# Patient Record
Sex: FEMALE | Race: WHITE | NOT HISPANIC OR LATINO | Employment: FULL TIME | URBAN - METROPOLITAN AREA
[De-identification: names, ages, dates, MRNs, and addresses within clinical notes are randomized per-mention and may not be internally consistent; named-entity substitution may affect disease eponyms.]

---

## 2017-01-16 RX ORDER — LORAZEPAM 1 MG/1
TABLET ORAL
Qty: 30 TABLET | Refills: 0 | OUTPATIENT
Start: 2017-01-16 | End: 2017-02-20 | Stop reason: SDUPTHER

## 2017-02-20 RX ORDER — LORAZEPAM 1 MG/1
TABLET ORAL
Qty: 30 TABLET | Refills: 0 | Status: SHIPPED | OUTPATIENT
Start: 2017-02-20 | End: 2017-03-21 | Stop reason: SDUPTHER

## 2017-02-20 RX ORDER — HYDROXYZINE HYDROCHLORIDE 25 MG/1
TABLET, FILM COATED ORAL
Qty: 30 TABLET | Refills: 1 | Status: SHIPPED | OUTPATIENT
Start: 2017-02-20 | End: 2017-04-19 | Stop reason: SDUPTHER

## 2017-02-22 RX ORDER — LORAZEPAM 1 MG/1
TABLET ORAL
Qty: 30 TABLET | Refills: 0 | OUTPATIENT
Start: 2017-02-22

## 2017-03-21 RX ORDER — LORAZEPAM 1 MG/1
TABLET ORAL
Qty: 30 TABLET | Refills: 0 | Status: SHIPPED | OUTPATIENT
Start: 2017-03-21 | End: 2017-04-19 | Stop reason: SDUPTHER

## 2017-04-26 RX ORDER — HYDROXYZINE HYDROCHLORIDE 25 MG/1
TABLET, FILM COATED ORAL
Qty: 30 TABLET | Refills: 1 | Status: SHIPPED | OUTPATIENT
Start: 2017-04-26 | End: 2017-06-15 | Stop reason: SDUPTHER

## 2017-04-26 RX ORDER — LORAZEPAM 1 MG/1
TABLET ORAL
Qty: 30 TABLET | Refills: 0 | OUTPATIENT
Start: 2017-04-26 | End: 2017-06-16 | Stop reason: SDUPTHER

## 2017-06-16 RX ORDER — LORAZEPAM 1 MG/1
TABLET ORAL
Qty: 30 TABLET | Refills: 0 | OUTPATIENT
Start: 2017-06-16 | End: 2017-06-19

## 2017-06-16 RX ORDER — HYDROXYZINE HYDROCHLORIDE 25 MG/1
TABLET, FILM COATED ORAL
Qty: 30 TABLET | Refills: 0 | Status: SHIPPED | OUTPATIENT
Start: 2017-06-16 | End: 2017-06-19

## 2017-06-16 RX ORDER — LORAZEPAM 1 MG/1
TABLET ORAL
Qty: 30 TABLET | Refills: 0 | OUTPATIENT
Start: 2017-06-16 | End: 2017-06-19 | Stop reason: SDUPTHER

## 2017-06-16 NOTE — TELEPHONE ENCOUNTER
You can call in #7 TABLETS ONLY with 0 refills.  Patient needs to be seen--it's the law.  Please schedule an appt for her in the next week.  Thanks.

## 2017-06-19 ENCOUNTER — OFFICE VISIT (OUTPATIENT)
Dept: FAMILY MEDICINE CLINIC | Facility: CLINIC | Age: 52
End: 2017-06-19

## 2017-06-19 VITALS
DIASTOLIC BLOOD PRESSURE: 76 MMHG | OXYGEN SATURATION: 100 % | BODY MASS INDEX: 34.15 KG/M2 | HEIGHT: 64 IN | TEMPERATURE: 97.6 F | WEIGHT: 200 LBS | HEART RATE: 76 BPM | SYSTOLIC BLOOD PRESSURE: 120 MMHG

## 2017-06-19 DIAGNOSIS — Z79.899 HIGH RISK MEDICATION USE: Primary | ICD-10-CM

## 2017-06-19 DIAGNOSIS — G47.00 INSOMNIA, UNSPECIFIED: ICD-10-CM

## 2017-06-19 DIAGNOSIS — R73.01 IMPAIRED FASTING GLUCOSE: ICD-10-CM

## 2017-06-19 DIAGNOSIS — Z79.899 HIGH RISK MEDICATION USE: ICD-10-CM

## 2017-06-19 DIAGNOSIS — G47.09 OTHER INSOMNIA: ICD-10-CM

## 2017-06-19 DIAGNOSIS — I10 ESSENTIAL HYPERTENSION: Primary | ICD-10-CM

## 2017-06-19 DIAGNOSIS — F41.9 ANXIETY DISORDER, UNSPECIFIED TYPE: ICD-10-CM

## 2017-06-19 DIAGNOSIS — E78.2 MIXED HYPERLIPIDEMIA: ICD-10-CM

## 2017-06-19 PROCEDURE — 99214 OFFICE O/P EST MOD 30 MIN: CPT | Performed by: NURSE PRACTITIONER

## 2017-06-19 RX ORDER — HYDROCHLOROTHIAZIDE 12.5 MG/1
12.5 CAPSULE, GELATIN COATED ORAL DAILY
Qty: 90 CAPSULE | Refills: 1 | Status: SHIPPED | OUTPATIENT
Start: 2017-06-19 | End: 2017-12-06 | Stop reason: SDUPTHER

## 2017-06-19 RX ORDER — OMEPRAZOLE 40 MG/1
40 CAPSULE, DELAYED RELEASE ORAL DAILY
Qty: 30 CAPSULE | Refills: 5
Start: 2017-06-19 | End: 2017-12-06 | Stop reason: SDUPTHER

## 2017-06-19 RX ORDER — LORAZEPAM 1 MG/1
TABLET ORAL
Qty: 30 TABLET | Refills: 0 | Status: SHIPPED | OUTPATIENT
Start: 2017-06-19 | End: 2017-07-25 | Stop reason: SDUPTHER

## 2017-06-19 RX ORDER — SULFASALAZINE 500 MG/1
1000 TABLET ORAL 3 TIMES DAILY
Qty: 540 TABLET | Refills: 1 | Status: SHIPPED | OUTPATIENT
Start: 2017-06-19

## 2017-06-19 RX ORDER — HYDROXYZINE 50 MG/1
50 TABLET, FILM COATED ORAL NIGHTLY PRN
Qty: 30 TABLET | Refills: 5 | Status: SHIPPED | OUTPATIENT
Start: 2017-06-19 | End: 2017-12-06 | Stop reason: SDUPTHER

## 2017-06-19 RX ORDER — AMLODIPINE BESYLATE 5 MG/1
5 TABLET ORAL DAILY
Qty: 90 TABLET | Refills: 1 | Status: SHIPPED | OUTPATIENT
Start: 2017-06-19 | End: 2017-11-28 | Stop reason: SDUPTHER

## 2017-06-19 RX ORDER — METOPROLOL TARTRATE 100 MG/1
100 TABLET ORAL 2 TIMES DAILY
Qty: 180 TABLET | Refills: 1 | Status: SHIPPED | OUTPATIENT
Start: 2017-06-19 | End: 2017-11-28 | Stop reason: SDUPTHER

## 2017-06-19 NOTE — PROGRESS NOTES
Subjective   Nelia Todd is a 51 y.o. female.     History of Present Illness   6 month f/u.  Needs fasting blood work and UDS.      Insomnia/anxiety disorder:  Taking hydroxyzine 2-25 mg tablets at bedtime along with lorazepam 1 mg.  She has taken this for many years.  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      HTN:  Controlled with meds.    Mixed hyperlipidemia/impaired fasting glucose:  Needs fasting blood work.  Last TGs in December 2016 were elevated at 255.  She really has not made any dietary changes and not lost any weight since then.      The following portions of the patient's history were reviewed and updated as appropriate: allergies, current medications, past family history, past medical history, past social history, past surgical history and problem list.    Review of Systems   Respiratory: Negative.    Cardiovascular: Negative.    Psychiatric/Behavioral: Positive for sleep disturbance. The patient is not nervous/anxious.    All other systems reviewed and are negative.      Objective   Physical Exam   Constitutional: Vital signs are normal. She appears well-developed and well-nourished.   Cardiovascular: Normal rate, regular rhythm, S1 normal, S2 normal and normal heart sounds.    No murmur heard.  Pulses:       Carotid pulses are 2+ on the right side, and 2+ on the left side.  No carotid bruits   Pulmonary/Chest: Effort normal and breath sounds normal.   Neurological: She is alert.   Psychiatric: She has a normal mood and affect.   Nursing note and vitals reviewed.      Assessment/Plan   Nelia was seen today for medication problem.    Diagnoses and all orders for this visit:    Essential hypertension    Mixed hyperlipidemia    High risk medication use    Insomnia, unspecified    Anxiety disorder, unspecified type    Impaired fasting glucose    Other orders  -     hydrOXYzine (ATARAX) 50 MG tablet; Take 1 tablet by mouth At Night As Needed (sleep).  -     LORazepam (ATIVAN) 1 MG tablet; TAKE ONE  TABLET BY MOUTH EVERY NIGHT AT BEDTIME  -     omeprazole (priLOSEC) 40 MG capsule; Take 1 capsule by mouth Daily.  -     sulfaSALAzine (AZULFIDINE) 500 MG tablet; Take 2 tablets by mouth 3 (Three) Times a Day.  -     metoprolol tartrate (LOPRESSOR) 100 MG tablet; Take 1 tablet by mouth 2 (Two) Times a Day.  -     hydrochlorothiazide (MICROZIDE) 12.5 MG capsule; Take 1 capsule by mouth Daily.  -     amLODIPine (NORVASC) 5 MG tablet; Take 1 tablet by mouth Daily.        Await lab results.  ANNABELLE MAGAÑA, reviewed/acceptable.      25 min spent with patient with >50% in counseling and coordination of care.      RTC 6 months.

## 2017-06-22 ENCOUNTER — TELEPHONE (OUTPATIENT)
Dept: FAMILY MEDICINE CLINIC | Facility: CLINIC | Age: 52
End: 2017-06-22

## 2017-06-22 NOTE — TELEPHONE ENCOUNTER
----- Message from SAMIA Merritt sent at 6/22/2017  3:56 PM EDT -----  Please ashleigh pt.  She was here on 6/19.  She was supposed to give a urine sample for drug screen.  I do not see any results yet.  Did she not give use a urine sample?

## 2017-06-23 ENCOUNTER — TELEPHONE (OUTPATIENT)
Dept: FAMILY MEDICINE CLINIC | Facility: CLINIC | Age: 52
End: 2017-06-23

## 2017-06-23 DIAGNOSIS — G47.00 INSOMNIA, UNSPECIFIED TYPE: ICD-10-CM

## 2017-06-23 DIAGNOSIS — Z79.899 HIGH RISK MEDICATION USE: ICD-10-CM

## 2017-06-23 DIAGNOSIS — F41.9 ANXIETY: Primary | ICD-10-CM

## 2017-06-23 NOTE — TELEPHONE ENCOUNTER
----- Message from SAMIA Merritt sent at 6/22/2017  5:22 PM EDT -----  Call pt.  Inform her that good cholesterol is too low and triglycerides are too high.  I rec decreasing her intake of sweets and increase exercise improve her levels.  Please ask her about the urine drug screen--I don't have results other than what was done at [facility].

## 2017-06-23 NOTE — TELEPHONE ENCOUNTER
"PATIENT RETURNED PHONE CALL. SHE SAYS, \"I DID LEAVE A URINE SAMPLE AND THE LAB LADY TOOK IT.\"   "

## 2017-06-23 NOTE — TELEPHONE ENCOUNTER
----- Message from SAMIA Merritt sent at 6/23/2017 12:20 PM EDT -----  Regarding: urine drug screen  Yes, she will need to come back in.    ----- Message -----     From: Roger Uribe MA     Sent: 6/23/2017  10:03 AM       To: SAMIA Merritt    Patient left message saying that she did leave a urine sample and the lab lady took it. She also says she is willing to come back in to do it again if she needs.      ----- Message -----     From: SAMIA Merritt     Sent: 6/22/2017   3:56 PM       To: Roger Uribe MA    Please ashleigh pt.  She was here on 6/19.  She was supposed to give a urine sample for drug screen.  I do not see any results yet.  Did she not give use a urine sample?

## 2017-06-26 LAB — DRUGS UR: NORMAL

## 2017-07-04 LAB — DRUGS UR: NORMAL

## 2017-07-25 RX ORDER — LORAZEPAM 1 MG/1
TABLET ORAL
Qty: 30 TABLET | Refills: 0 | OUTPATIENT
Start: 2017-07-25 | End: 2017-08-29 | Stop reason: SDUPTHER

## 2017-08-29 RX ORDER — LORAZEPAM 1 MG/1
TABLET ORAL
Qty: 30 TABLET | Refills: 0 | OUTPATIENT
Start: 2017-08-29 | End: 2017-11-06 | Stop reason: SDUPTHER

## 2017-09-26 ENCOUNTER — TELEPHONE (OUTPATIENT)
Dept: FAMILY MEDICINE CLINIC | Facility: CLINIC | Age: 52
End: 2017-09-26

## 2017-09-26 NOTE — TELEPHONE ENCOUNTER
Patient called regarding a $600 bill for two differnt Urine drug screen. I told patient to bring the bill into the office to speak with and show the  Dennies Garrick.

## 2017-11-06 RX ORDER — LORAZEPAM 1 MG/1
TABLET ORAL
Qty: 30 TABLET | Refills: 0 | OUTPATIENT
Start: 2017-11-06 | End: 2017-12-05 | Stop reason: SDUPTHER

## 2017-11-29 RX ORDER — METOPROLOL TARTRATE 100 MG/1
TABLET ORAL
Qty: 180 TABLET | Refills: 1 | Status: SHIPPED | OUTPATIENT
Start: 2017-11-29

## 2017-11-29 RX ORDER — AMLODIPINE BESYLATE 5 MG/1
TABLET ORAL
Qty: 90 TABLET | Refills: 1 | Status: SHIPPED | OUTPATIENT
Start: 2017-11-29

## 2017-12-05 ENCOUNTER — OFFICE VISIT (OUTPATIENT)
Dept: FAMILY MEDICINE CLINIC | Facility: CLINIC | Age: 52
End: 2017-12-05

## 2017-12-05 VITALS
HEIGHT: 64 IN | HEART RATE: 72 BPM | OXYGEN SATURATION: 98 % | DIASTOLIC BLOOD PRESSURE: 78 MMHG | BODY MASS INDEX: 35.85 KG/M2 | TEMPERATURE: 98.1 F | SYSTOLIC BLOOD PRESSURE: 124 MMHG | WEIGHT: 210 LBS

## 2017-12-05 DIAGNOSIS — K21.9 GASTROESOPHAGEAL REFLUX DISEASE WITHOUT ESOPHAGITIS: ICD-10-CM

## 2017-12-05 DIAGNOSIS — I10 ESSENTIAL HYPERTENSION: Primary | ICD-10-CM

## 2017-12-05 DIAGNOSIS — E78.2 MIXED HYPERLIPIDEMIA: ICD-10-CM

## 2017-12-05 DIAGNOSIS — Z79.899 HIGH RISK MEDICATION USE: ICD-10-CM

## 2017-12-05 DIAGNOSIS — F41.9 ANXIETY DISORDER, UNSPECIFIED TYPE: ICD-10-CM

## 2017-12-05 DIAGNOSIS — G47.00 INSOMNIA, UNSPECIFIED TYPE: ICD-10-CM

## 2017-12-05 PROCEDURE — 99214 OFFICE O/P EST MOD 30 MIN: CPT | Performed by: NURSE PRACTITIONER

## 2017-12-05 RX ORDER — LORAZEPAM 1 MG/1
TABLET ORAL
Qty: 30 TABLET | Refills: 0 | Status: SHIPPED | OUTPATIENT
Start: 2017-12-05

## 2017-12-05 NOTE — PROGRESS NOTES
Subjective   Nelia Todd is a 52 y.o. female.     History of Present Illness   6 month f/u.  Htn, HLD, GERD, anxiety, insomnia all stable on meds.  Takes lorazepam for anxiety.  Hydroxyzine prn insomnia.  Needs refills.      The following portions of the patient's history were reviewed and updated as appropriate: allergies, current medications, past family history, past medical history, past social history, past surgical history and problem list.    Review of Systems   Constitutional: Negative.    Respiratory: Negative.    Cardiovascular: Negative.    Gastrointestinal: Negative.    Psychiatric/Behavioral: Positive for sleep disturbance.        Per HPI   All other systems reviewed and are negative.      Objective   Physical Exam   Constitutional: Vital signs are normal. She appears well-developed and well-nourished.   Cardiovascular: Normal rate, regular rhythm, S1 normal, S2 normal and normal heart sounds.    No murmur heard.  Pulmonary/Chest: Effort normal and breath sounds normal.   Neurological: She is alert.   Psychiatric: She has a normal mood and affect.   Nursing note and vitals reviewed.      Assessment/Plan   Nelia was seen today for follow-up.    Diagnoses and all orders for this visit:    Essential hypertension    Mixed hyperlipidemia    Gastroesophageal reflux disease without esophagitis    Anxiety disorder, unspecified type    Insomnia, unspecified type    High risk medication use    Other orders  -     LORazepam (ATIVAN) 1 MG tablet; TAKE ONE TABLET BY MOUTH EVERY NIGHT AT BEDTIME  -     hydrochlorothiazide (MICROZIDE) 12.5 MG capsule; Take 1 capsule by mouth Daily.  -     hydrOXYzine (ATARAX) 50 MG tablet; Take 1 tablet by mouth At Night As Needed (sleep).  -     omeprazole (priLOSEC) 40 MG capsule; Take 1 capsule by mouth Daily.      mya up to date.   Refills given.  F/u 6 months.

## 2017-12-06 RX ORDER — HYDROCHLOROTHIAZIDE 12.5 MG/1
12.5 CAPSULE, GELATIN COATED ORAL DAILY
Qty: 90 CAPSULE | Refills: 1 | Status: SHIPPED | OUTPATIENT
Start: 2017-12-06

## 2017-12-06 RX ORDER — HYDROXYZINE 50 MG/1
50 TABLET, FILM COATED ORAL NIGHTLY PRN
Qty: 30 TABLET | Refills: 5 | Status: SHIPPED | OUTPATIENT
Start: 2017-12-06

## 2017-12-06 RX ORDER — OMEPRAZOLE 40 MG/1
40 CAPSULE, DELAYED RELEASE ORAL DAILY
Qty: 30 CAPSULE | Refills: 5
Start: 2017-12-06

## 2017-12-20 ENCOUNTER — OFFICE VISIT (OUTPATIENT)
Dept: FAMILY MEDICINE CLINIC | Facility: CLINIC | Age: 52
End: 2017-12-20

## 2017-12-20 VITALS
SYSTOLIC BLOOD PRESSURE: 122 MMHG | RESPIRATION RATE: 14 BRPM | HEIGHT: 64 IN | TEMPERATURE: 98.6 F | BODY MASS INDEX: 35.68 KG/M2 | WEIGHT: 209 LBS | OXYGEN SATURATION: 98 % | DIASTOLIC BLOOD PRESSURE: 82 MMHG | HEART RATE: 76 BPM

## 2017-12-20 DIAGNOSIS — K51.90 ULCERATIVE COLITIS WITHOUT COMPLICATIONS, UNSPECIFIED LOCATION (HCC): Primary | ICD-10-CM

## 2017-12-20 DIAGNOSIS — K21.9 GASTROESOPHAGEAL REFLUX DISEASE WITHOUT ESOPHAGITIS: ICD-10-CM

## 2017-12-20 DIAGNOSIS — G47.00 INSOMNIA, UNSPECIFIED TYPE: ICD-10-CM

## 2017-12-20 PROCEDURE — 99213 OFFICE O/P EST LOW 20 MIN: CPT | Performed by: NURSE PRACTITIONER

## 2017-12-20 RX ORDER — UREA 10 %
LOTION (ML) TOPICAL
COMMUNITY

## 2017-12-20 NOTE — PROGRESS NOTES
"Subjective   Nelia Todd is a 52 y.o. female.     History of Present Illness   Nelia Todd 52 y.o. female who presents today for a new patient appointment.    she has a history of   Patient Active Problem List   Diagnosis   • History of colon polyps   • Hiatal hernia   • Anxiety disorder   • Insomnia, unspecified   • Gastroesophageal reflux disease   • Essential hypertension   • Ulcerative colitis without complications   • Mixed hyperlipidemia   • High risk medication use   • Impaired fasting glucose   .  she is here to establish care I reviewed the PFSH recorded today by my MA/LPN staff.   she   She has been feeling well.      Patient is presenting to the office today to get established.  Her main concern is getting a prescription when it is due in January for her lorazepam which she takes nightly for insomnia.  She also uses melatonin and Atarax for insomnia.  Patient has been using lorazepam 1 mg every night for the past 5 years.  She was seeing Milly Ramírez nurse practitioner for the past year he has been refilling this medication for her.  Looking back in her chart her drug screens have all been within normal limits and appropriate.    The following portions of the patient's history were reviewed and updated as appropriate: allergies, current medications, past social history and problem list.    Review of Systems   Constitutional: Negative for activity change.   All other systems reviewed and are negative.      Objective   /82 (BP Location: Left arm, Patient Position: Sitting, Cuff Size: Adult)  Pulse 76  Temp 98.6 °F (37 °C) (Oral)   Resp 14  Ht 162.6 cm (64\")  Wt 94.8 kg (209 lb)  SpO2 98%  BMI 35.87 kg/m2  Physical Exam   Constitutional: She is oriented to person, place, and time. Vital signs are normal. She appears well-developed and well-nourished. No distress.   HENT:   Head: Normocephalic.   Cardiovascular: Normal rate, regular rhythm and normal heart sounds.    Pulmonary/Chest: " Effort normal and breath sounds normal.   Neurological: She is alert and oriented to person, place, and time. Gait normal.   Psychiatric: She has a normal mood and affect. Her behavior is normal. Judgment and thought content normal.   Vitals reviewed.      Assessment/Plan   Problem List Items Addressed This Visit        Digestive    Gastroesophageal reflux disease    Ulcerative colitis without complications - Primary       Other    Insomnia, unspecified        I discussed with her at length that it is inappropriate to use lorazepam daily for 5 years for any reason including insomnia.  I stated that I do not prescribe that type of medication for insomnia and I'm actively seeking to get all of my patients that use this type of medication off of them and onto something more safe.  Patient verbalized that she was very upset that she would no longer be coming to [facility] that this was not going to work.  I reassured her that I would work with her to get her off the medication safely and onto a more safe medication to help her with her insomnia.  She stated that she did not want to do that because this is been working great for her for the past 5 years.  She left the office stating that she was going to contact [facility] and complained.

## 2019-01-18 ENCOUNTER — OFFICE VISIT (OUTPATIENT)
Dept: RETAIL CLINIC | Facility: CLINIC | Age: 54
End: 2019-01-18

## 2019-01-18 VITALS
OXYGEN SATURATION: 96 % | SYSTOLIC BLOOD PRESSURE: 120 MMHG | TEMPERATURE: 98.2 F | RESPIRATION RATE: 15 BRPM | HEART RATE: 62 BPM | DIASTOLIC BLOOD PRESSURE: 72 MMHG

## 2019-01-18 DIAGNOSIS — K52.9 GASTROENTERITIS: Primary | ICD-10-CM

## 2019-01-18 LAB
EXPIRATION DATE: NORMAL
FLUAV AG NPH QL: NEGATIVE
FLUBV AG NPH QL: NEGATIVE
INTERNAL CONTROL: NORMAL
Lab: NORMAL

## 2019-01-18 PROCEDURE — 99213 OFFICE O/P EST LOW 20 MIN: CPT | Performed by: NURSE PRACTITIONER

## 2019-01-18 PROCEDURE — 87804 INFLUENZA ASSAY W/OPTIC: CPT | Performed by: NURSE PRACTITIONER

## 2019-01-18 NOTE — PATIENT INSTRUCTIONS
Viral Gastroenteritis, Adult  Viral gastroenteritis is also known as the stomach flu. This condition is caused by various viruses. These viruses can be passed from person to person very easily (are very contagious). This condition may affect your stomach, small intestine, and large intestine. It can cause sudden watery diarrhea, fever, and vomiting.  Diarrhea and vomiting can make you feel weak and cause you to become dehydrated. You may not be able to keep fluids down. Dehydration can make you tired and thirsty, cause you to have a dry mouth, and decrease how often you urinate. Older adults and people with other diseases or a weak immune system are at higher risk for dehydration.  It is important to replace the fluids that you lose from diarrhea and vomiting. If you become severely dehydrated, you may need to get fluids through an IV tube.  What are the causes?  Gastroenteritis is caused by various viruses, including rotavirus and norovirus. Norovirus is the most common cause in adults.  You can get sick by eating food, drinking water, or touching a surface contaminated with one of these viruses. You can also get sick from sharing utensils or other personal items with an infected person.  What increases the risk?  This condition is more likely to develop in people:  · Who have a weak defense system (immune system).  · Who live with one or more children who are younger than 2 years old.  · Who live in a nursing home.  · Who go on cruise ships.    What are the signs or symptoms?  Symptoms of this condition start suddenly 1-2 days after exposure to a virus. Symptoms may last a few days or as long as a week. The most common symptoms are watery diarrhea and vomiting. Other symptoms include:  · Fever.  · Headache.  · Fatigue.  · Pain in the abdomen.  · Chills.  · Weakness.  · Nausea.  · Muscle aches.  · Loss of appetite.    How is this diagnosed?  This condition is diagnosed with a medical history and physical exam. You  may also have a stool test to check for viruses or other infections.  How is this treated?  This condition typically goes away on its own. The focus of treatment is to restore lost fluids (rehydration). Your health care provider may recommend that you take an oral rehydration solution (ORS) to replace important salts and minerals (electrolytes) in your body. Severe cases of this condition may require giving fluids through an IV tube.  Treatment may also include medicine to help with your symptoms.  Follow these instructions at home:  Follow instructions from your health care provider about how to care for yourself at home.  Eating and drinking  Follow these recommendations as told by your health care provider:  · Take an ORS. This is a drink that is sold at pharmacies and retail stores.  · Drink clear fluids in small amounts as you are able. Clear fluids include water, ice chips, diluted fruit juice, and low-calorie sports drinks.  · Eat bland, easy-to-digest foods in small amounts as you are able. These foods include bananas, applesauce, rice, lean meats, toast, and crackers.  · Avoid fluids that contain a lot of sugar or caffeine, such as energy drinks, sports drinks, and soda.  · Avoid alcohol.  · Avoid spicy or fatty foods.    General instructions    · Drink enough fluid to keep your urine clear or pale yellow.  · Wash your hands often. If soap and water are not available, use hand .  · Make sure that all people in your household wash their hands well and often.  · Take over-the-counter and prescription medicines only as told by your health care provider.  · Rest at home while you recover.  · Watch your condition for any changes.  · Take a warm bath to relieve any burning or pain from frequent diarrhea episodes.  · Keep all follow-up visits as told by your health care provider. This is important.  Contact a health care provider if:  · You cannot keep fluids down.  · Your symptoms get worse.  · You have  new symptoms.  · You feel light-headed or dizzy.  · You have muscle cramps.  Get help right away if:  · You have chest pain.  · You feel extremely weak or you faint.  · You see blood in your vomit.  · Your vomit looks like coffee grounds.  · You have bloody or black stools or stools that look like tar.  · You have a severe headache, a stiff neck, or both.  · You have a rash.  · You have severe pain, cramping, or bloating in your abdomen.  · You have trouble breathing or you are breathing very quickly.  · Your heart is beating very quickly.  · Your skin feels cold and clammy.  · You feel confused.  · You have pain when you urinate.  · You have signs of dehydration, such as:  ? Dark urine, very little urine, or no urine.  ? Cracked lips.  ? Dry mouth.  ? Sunken eyes.  ? Sleepiness.  ? Weakness.  This information is not intended to replace advice given to you by your health care provider. Make sure you discuss any questions you have with your health care provider.  Document Released: 12/18/2006 Document Revised: 05/31/2017 Document Reviewed: 08/23/2016  [employer] Interactive Patient Education © 2018 [employer]

## 2019-01-18 NOTE — PROGRESS NOTES
Subjective:     Nelia Todd is a 53 y.o.     Flu Symptoms   The current episode started in the past 7 days. Associated symptoms include congestion (mild), fatigue (no off days in 14 days), headaches (mild), nausea and a sore throat (mild). Pertinent negatives include no coughing, fever or vomiting. Treatments tried: zofran. phenergan at 8am. The treatment provided mild relief.         The following portions of the patient's history were reviewed and updated as appropriate: allergies, current medications, past family history, past medical history, past social history, past surgical history and problem list.      Review of Systems   Constitutional: Positive for fatigue (no off days in 14 days). Negative for fever.   HENT: Positive for congestion (mild) and sore throat (mild). Negative for ear pain.    Respiratory: Negative.  Negative for cough and shortness of breath.    Cardiovascular: Negative.    Gastrointestinal: Positive for nausea. Negative for diarrhea and vomiting.   Neurological: Positive for headaches (mild).         Objective:      Physical Exam   HENT:   Head: Normocephalic and atraumatic.   Right Ear: Tympanic membrane and ear canal normal.   Left Ear: Ear canal normal.   Nose: Nose normal.   Mouth/Throat: No oropharyngeal exudate or posterior oropharyngeal erythema.   Cardiovascular: Normal rate, regular rhythm, S1 normal, S2 normal and normal heart sounds.   Pulmonary/Chest: Effort normal and breath sounds normal.   Abdominal: Soft. Normal appearance and bowel sounds are normal.   Lymphadenopathy:     She has no cervical adenopathy.   Vitals reviewed.          Nelia was seen today for flu symptoms.    Diagnoses and all orders for this visit:    Gastroenteritis  -     POC Influenza A / B

## 2019-03-04 ENCOUNTER — TRANSCRIBE ORDERS (OUTPATIENT)
Dept: ADMINISTRATIVE | Facility: HOSPITAL | Age: 54
End: 2019-03-04

## 2019-03-04 DIAGNOSIS — Z12.39 SCREENING BREAST EXAMINATION: Primary | ICD-10-CM

## 2019-03-05 ENCOUNTER — HOSPITAL ENCOUNTER (OUTPATIENT)
Dept: MAMMOGRAPHY | Facility: HOSPITAL | Age: 54
Discharge: HOME | End: 2019-03-05
Admitting: FAMILY MEDICINE

## 2019-03-05 DIAGNOSIS — Z12.39 SCREENING BREAST EXAMINATION: ICD-10-CM

## 2019-03-05 PROCEDURE — 77067 SCR MAMMO BI INCL CAD: CPT

## 2019-03-05 PROCEDURE — 77063 BREAST TOMOSYNTHESIS BI: CPT
